# Patient Record
Sex: FEMALE | Race: AMERICAN INDIAN OR ALASKA NATIVE
[De-identification: names, ages, dates, MRNs, and addresses within clinical notes are randomized per-mention and may not be internally consistent; named-entity substitution may affect disease eponyms.]

---

## 2018-05-04 ENCOUNTER — HOSPITAL ENCOUNTER (OUTPATIENT)
Dept: HOSPITAL 5 - GIO | Age: 48
Discharge: HOME | End: 2018-05-04
Attending: INTERNAL MEDICINE
Payer: COMMERCIAL

## 2018-05-04 VITALS — DIASTOLIC BLOOD PRESSURE: 83 MMHG | SYSTOLIC BLOOD PRESSURE: 129 MMHG

## 2018-05-04 DIAGNOSIS — K44.9: ICD-10-CM

## 2018-05-04 DIAGNOSIS — Z79.82: ICD-10-CM

## 2018-05-04 DIAGNOSIS — K29.50: ICD-10-CM

## 2018-05-04 DIAGNOSIS — K21.0: Primary | ICD-10-CM

## 2018-05-04 DIAGNOSIS — I10: ICD-10-CM

## 2018-05-04 PROCEDURE — 88305 TISSUE EXAM BY PATHOLOGIST: CPT

## 2018-05-04 PROCEDURE — 88342 IMHCHEM/IMCYTCHM 1ST ANTB: CPT

## 2018-05-04 PROCEDURE — 43239 EGD BIOPSY SINGLE/MULTIPLE: CPT

## 2018-05-04 NOTE — OPERATIVE REPORT
Operative Report


Operative Report: 


Date: 05/04/2018 


   


Operative Report:


 


Date of procedure: 05/04/2018





Procedure: Esophagogastroduodenoscopy with multiple mucosal biopsies





Attending physician: Yuriy Pedro MD





: Yuriy Pedro MD





Indication: Patient is a 47 year-old female who presented with  epigastric pain 

and heartburn and indigestion.   An upper endoscopy is done to evaluate patient

  so that treatment may be directed based on the findings.





Consent: Informed consent was obtained after advising the patient and family 

regarding nature of this procedure, its indications, potential benefits as well 

as possible complications including but not limited to bleeding perforation and 

adverse reaction to medication, infection as well as other cardiopulmonary 

complications.  An informed written and verbal consent was then obtained after 

due opportunity was provided for questions and answers.





Monitoring: Patient was monitored continuously with pulse oximetry and 

electrocardiographic recordings as well as blood pressure recordings.  Vital 

signs remained stable throughout this procedure with no untoward events.





Preoperative assessment: Patient was assessed immediately prior to this 

procedure for capacity to tolerate monitored anesthesia care and moderate 

sedation as well as general anesthesia.  Patient's ASA classification is 2, 

Mallampati class is 2, Hyomental distance is 3.





Instrument: Remediation of [place] video endoscope





Medications: Propofol given intravenously in divided doses.  For details please 

refer to anesthesia records.





Description of procedure: Patient was placed in the left lateral decubitus 

position after achieving sedation, the endoscope was introduced into the 

esophagus under direct vision.  It was then advanced beyond the esophagus into 

the stomach and then beyond the stomach into the duodenum and to the second 

portion of the duodenum.  It was subsequently withdrawn with careful inspection 

of all mucosal surfaces with the following findings.





Findings: In the distal esophagus, patient had mild erosive esophagitis.   

There was a 2-3 cm sliding hiatal hernia seen on entry into the stomach There 

was erythema and erosions in the gastric antrum.  Biopsies of the antrum were 

obtained for histopathology.  The duodenum was normal to second portion.





Impression: Mild erosive esophagitis.


Hiatal hernia.


Mucosal changes suggestive of gastritis.





Plan: Follow pathology report.


Continue treatment proton pump inhibitors.


Maintain antireflux measures.


Direct additional treatment based on the pathology report.

## 2018-05-04 NOTE — DISCHARGE SUMMARY
Short Stay Discharge Plan


Activity: advance as tolerated


Weight Bearing Status: Weight Bear as Tolerated


Diet: regular


Follow up with: 


MARYJANE ALLEN MD [Primary Care Provider] - 7 Days